# Patient Record
Sex: MALE | Race: WHITE | URBAN - METROPOLITAN AREA
[De-identification: names, ages, dates, MRNs, and addresses within clinical notes are randomized per-mention and may not be internally consistent; named-entity substitution may affect disease eponyms.]

---

## 2019-03-17 ENCOUNTER — HOSPITAL ENCOUNTER (EMERGENCY)
Facility: CLINIC | Age: 49
Discharge: HOME | End: 2019-03-17
Attending: NURSE PRACTITIONER | Admitting: NURSE PRACTITIONER
Payer: COMMERCIAL

## 2019-03-17 VITALS
OXYGEN SATURATION: 97 % | HEART RATE: 101 BPM | TEMPERATURE: 97.9 F | RESPIRATION RATE: 16 BRPM | WEIGHT: 254 LBS | SYSTOLIC BLOOD PRESSURE: 156 MMHG | DIASTOLIC BLOOD PRESSURE: 80 MMHG

## 2019-03-17 DIAGNOSIS — S01.01XA LACERATION OF SCALP, INITIAL ENCOUNTER: ICD-10-CM

## 2019-03-17 DIAGNOSIS — S09.90XA CLOSED HEAD INJURY, INITIAL ENCOUNTER: ICD-10-CM

## 2019-03-17 PROCEDURE — G0463 HOSPITAL OUTPT CLINIC VISIT: HCPCS

## 2019-03-17 PROCEDURE — 99213 OFFICE O/P EST LOW 20 MIN: CPT | Mod: Z6 | Performed by: NURSE PRACTITIONER

## 2019-03-17 ASSESSMENT — ENCOUNTER SYMPTOMS
FATIGUE: 0
DIZZINESS: 0
BACK PAIN: 0
VOMITING: 0
NUMBNESS: 0
NAUSEA: 0
WOUND: 1
HEADACHES: 0
NECK PAIN: 0

## 2019-03-17 NOTE — ED PROVIDER NOTES
History     Chief Complaint   Patient presents with     Head Injury     fell cut on head, no blood thinners no LOC     HPI  Doug Alfaro is a 48 year old male who presents to urgent care for evaluation of scalp laceration after falling on the ice today at 10 am, 2.5 hours ago, hitting the back of his head. Denies LOC. Denies N/V. Denies dizziness. Denies headache. Denies neck or back pain. Bleeding is controlled.    Allergies:  No Known Allergies    Problem List:    There are no active problems to display for this patient.       Past Medical History:    No past medical history on file.    Past Surgical History:    No past surgical history on file.    Family History:    No family history on file.    Social History:  Marital Status:   [2]  Social History     Tobacco Use     Smoking status: Not on file   Substance Use Topics     Alcohol use: Not on file     Drug use: Not on file        Medications:      No current outpatient medications on file.      Review of Systems   Constitutional: Negative for fatigue.   Gastrointestinal: Negative for nausea and vomiting.   Musculoskeletal: Negative for back pain and neck pain.   Skin: Positive for wound.   Neurological: Negative for dizziness, syncope, numbness and headaches.       Physical Exam   BP: 156/80  Pulse: 101  Temp: 97.9  F (36.6  C)  Resp: 16  Weight: 115.2 kg (254 lb)  SpO2: 97 %      Physical Exam    GENERAL APPEARANCE: healthy, alert and no distress  EYES: EOMI, conjunctiva clear. No raccoons eyes.  HENT: bilateral ear canals clear, intact, and without inflammation. Right TM normal, no hemotympanum. Left TM normal, no hemotympanum. No mastoid tenderness. Nose normal.  Oropharynx without ulcers, erythema or lesions. No malocclusion.   NECK: supple, nontender, no lymphadenopathy  RESP: lungs clear to auscultation - no rales, rhonchi or wheezes  CV: regular rates and rhythm, normal S1 S2, no murmur noted  NEURO: Normal strength and tone, sensory exam grossly  normal,  normal speech and mentation  SKIN: 2cm laceration to posterior scalp, superficial, wound edges are well approximated.    ED Course        Procedures             No results found for this or any previous visit (from the past 24 hour(s)).    Medications - No data to display    Assessments & Plan (with Medical Decision Making)   History and exam reveal no red flags or worrisome signs to indicate need for emergent imaging. Scalp laceration superficial, no need for sutures/staples. Worrisome reasons to recheck discussed as well as symptoms of concussion for which he should immediately return to the ED for re-evaluation.  I have reviewed the nursing notes.    I have reviewed the findings, diagnosis, plan and need for follow up with the patient.       [place] Head CT Rule  (calculator)  Background  Assesses need for head imaging in acute trauma  Only validated in adults with GCS 13-15 with witnessed LOC, amnesia to head injury or confusion  Data  48 year old  High Risk Criteria (major criteria)   Of 5 possible items  NEGATIVE    Moderate Risk Criteria (minor criteria)   Of 3 possible items  NEGATIVE    Interpretation  No indications for head imaging           Medication List      There are no discharge medications for this visit.         Final diagnoses:   Laceration of scalp, initial encounter   Closed head injury, initial encounter       3/17/2019   [facility] EMERGENCY DEPARTMENT     Jen Hutton APRN CNP  03/17/19 [id number]

## 2019-03-17 NOTE — ED AVS SNAPSHOT
[facility] Emergency Department  [address] [address]  Phone:  [phone]  Fax:  [phone]                                    Doug Alfaro   MRN: [id number]    Department:  [facility] Emergency Department   Date of Visit:  3/17/2019           After Visit Summary Signature Page    I have received my discharge instructions, and my questions have been answered. I have discussed any challenges I see with this plan with the nurse or doctor.    ..........................................................................................................................................  Patient/Patient Representative Signature      ..........................................................................................................................................  Patient Representative Print Name and Relationship to Patient    ..................................................               ................................................  Date                                   Time    ..........................................................................................................................................  Reviewed by Signature/Title    ...................................................              ..............................................  Date                                               Time          22EPIC Rev 08/18